# Patient Record
Sex: MALE | Race: WHITE | Employment: FULL TIME | URBAN - METROPOLITAN AREA
[De-identification: names, ages, dates, MRNs, and addresses within clinical notes are randomized per-mention and may not be internally consistent; named-entity substitution may affect disease eponyms.]

---

## 2020-03-23 ENCOUNTER — TELEMEDICINE (OUTPATIENT)
Dept: TELEHEALTH | Age: 35
End: 2020-03-23

## 2020-03-23 DIAGNOSIS — J06.9 VIRAL UPPER RESPIRATORY TRACT INFECTION: Primary | ICD-10-CM

## 2020-03-23 PROCEDURE — 99213 OFFICE O/P EST LOW 20 MIN: CPT | Performed by: INTERNAL MEDICINE

## 2020-03-23 NOTE — PROGRESS NOTES
Chief complaint  Cough for a few days    HPI:   28 yo male with mild allergy induced asthma. Pt has had a mild cough with mild peter for about 3 days. no f/c. No fatigue or body aches, no n/v/d. Feels good otherwise, Albuterol inhaler he has at home helps.  Deanna Taylor

## 2021-01-29 PROBLEM — M77.9 TENDINITIS: Status: ACTIVE | Noted: 2021-01-29
